# Patient Record
Sex: FEMALE | Race: WHITE | NOT HISPANIC OR LATINO | Employment: FULL TIME | URBAN - METROPOLITAN AREA
[De-identification: names, ages, dates, MRNs, and addresses within clinical notes are randomized per-mention and may not be internally consistent; named-entity substitution may affect disease eponyms.]

---

## 2017-03-14 ENCOUNTER — OFFICE VISIT (OUTPATIENT)
Dept: OBSTETRICS AND GYNECOLOGY | Facility: CLINIC | Age: 57
End: 2017-03-14

## 2017-03-14 VITALS
SYSTOLIC BLOOD PRESSURE: 137 MMHG | DIASTOLIC BLOOD PRESSURE: 82 MMHG | HEIGHT: 63 IN | WEIGHT: 190 LBS | BODY MASS INDEX: 33.66 KG/M2 | HEART RATE: 77 BPM

## 2017-03-14 DIAGNOSIS — N94.10 DYSPAREUNIA, FEMALE: Primary | ICD-10-CM

## 2017-03-14 PROCEDURE — 99203 OFFICE O/P NEW LOW 30 MIN: CPT | Performed by: OBSTETRICS & GYNECOLOGY

## 2017-03-14 RX ORDER — ALBUTEROL SULFATE 90 UG/1
2 AEROSOL, METERED RESPIRATORY (INHALATION) EVERY 6 HOURS
COMMUNITY

## 2017-03-14 RX ORDER — DILTIAZEM HYDROCHLORIDE 60 MG/1
TABLET, FILM COATED ORAL
COMMUNITY
Start: 2017-01-25

## 2017-03-14 NOTE — PROGRESS NOTES
"Chief Complaint   Patient presents with   • Pelvic Pain     referred by PCP       History of Present Illness    Patient is a 56 y.o. female complains of moderate pelvic pain. Pain is on the left and occurs during intercourse. Records from pcp reviewed. Patient had an ultrasound that had a small fibroid. Lining was on the upper limits of normal. Patient is menopausal for over a year and denies any bleeding. Patient does have history of interstitial cystitis but does not feel like her symptoms are related.     The following portions of the patient's history were reviewed and updated as appropriate: allergies, current medications, past family history, past medical history, past social history, past surgical history and problem list.    Review of Systems   Genitourinary: Positive for pelvic pain. Negative for vaginal bleeding.   All other systems reviewed and are negative.      Vitals:    03/14/17 0946   BP: 137/82   Pulse: 77   Weight: 190 lb (86.2 kg)   Height: 63\" (160 cm)       Objective   Physical Exam   Constitutional: She appears well-developed and well-nourished.   HENT:   Head: Normocephalic and atraumatic.   Abdominal: Soft. She exhibits no distension and no mass. There is no tenderness. There is no rebound and no guarding. No hernia.   Genitourinary: Rectum normal, vagina normal and uterus normal. Rectal exam shows no external hemorrhoid. There is no lesion on the right labia. There is no lesion on the left labia. Uterus is not tender. Cervix exhibits no discharge. Right adnexum displays no mass and no tenderness. Left adnexum displays no mass and no tenderness. No vaginal discharge found.   Genitourinary Comments: Atrophy noted   Musculoskeletal: Normal range of motion.   Lymphadenopathy:        Right: No inguinal adenopathy present.        Left: No inguinal adenopathy present.   Neurological: She is alert.   Skin: Skin is warm.   Psychiatric: She has a normal mood and affect.         Assessment/Plan "   Leilani was seen today for pelvic pain.    Diagnoses and all orders for this visit:    Dyspareunia, female    Suspect pain is from atrophy. Recommend over the counter lubricants. Risks of hormones reviewed - would like to avoid given cardiac history. Patient denies any bleeding so endometrial biopsy not indicated at this time. Patient to return if bleeding occurs.        Return if symptoms worsen or fail to improve.

## 2017-03-30 ENCOUNTER — TELEPHONE (OUTPATIENT)
Dept: OBSTETRICS AND GYNECOLOGY | Facility: CLINIC | Age: 57
End: 2017-03-30

## 2017-03-30 NOTE — TELEPHONE ENCOUNTER
----- Message from Kwasi Jimenez sent at 3/27/2017  2:12 PM EDT -----  RN with dr. Amaya office wants to discuss this patient concerning her ultrasound and course of care.    Ophelia(RN) -[phone]

## 2018-03-21 ENCOUNTER — OFFICE VISIT (OUTPATIENT)
Dept: OBSTETRICS AND GYNECOLOGY | Facility: CLINIC | Age: 58
End: 2018-03-21

## 2018-03-21 VITALS
BODY MASS INDEX: 33.31 KG/M2 | HEIGHT: 63 IN | WEIGHT: 188 LBS | HEART RATE: 69 BPM | SYSTOLIC BLOOD PRESSURE: 126 MMHG | DIASTOLIC BLOOD PRESSURE: 73 MMHG

## 2018-03-21 DIAGNOSIS — N94.10 DYSPAREUNIA, FEMALE: Primary | ICD-10-CM

## 2018-03-21 PROCEDURE — 99213 OFFICE O/P EST LOW 20 MIN: CPT | Performed by: OBSTETRICS & GYNECOLOGY

## 2020-09-16 ENCOUNTER — OFFICE (AMBULATORY)
Dept: URBAN - METROPOLITAN AREA CLINIC 75 | Facility: CLINIC | Age: 60
End: 2020-09-16

## 2020-09-16 VITALS — TEMPERATURE: 97.6 F | WEIGHT: 186 LBS | HEIGHT: 62 IN

## 2020-09-16 DIAGNOSIS — R14.0 ABDOMINAL DISTENSION (GASEOUS): ICD-10-CM

## 2020-09-16 DIAGNOSIS — K59.00 CONSTIPATION, UNSPECIFIED: ICD-10-CM

## 2020-09-16 DIAGNOSIS — R19.4 CHANGE IN BOWEL HABIT: ICD-10-CM

## 2020-09-16 DIAGNOSIS — R11.0 NAUSEA: ICD-10-CM

## 2020-09-16 DIAGNOSIS — R14.2 ERUCTATION: ICD-10-CM

## 2020-09-16 DIAGNOSIS — R12 HEARTBURN: ICD-10-CM

## 2020-09-16 PROCEDURE — 99244 OFF/OP CNSLTJ NEW/EST MOD 40: CPT | Performed by: INTERNAL MEDICINE

## 2020-09-16 RX ORDER — LINACLOTIDE 72 UG/1
CAPSULE, GELATIN COATED ORAL
Qty: 90 | Refills: 3 | Status: COMPLETED
Start: 2020-09-16 | End: 2020-12-08

## 2020-09-17 VITALS — HEIGHT: 62 IN | WEIGHT: 189 LBS

## 2020-12-16 ENCOUNTER — OFFICE (AMBULATORY)
Dept: URBAN - METROPOLITAN AREA CLINIC 75 | Facility: CLINIC | Age: 60
End: 2020-12-16
Payer: COMMERCIAL

## 2020-12-16 DIAGNOSIS — R14.0 ABDOMINAL DISTENSION (GASEOUS): ICD-10-CM

## 2020-12-16 DIAGNOSIS — R19.4 CHANGE IN BOWEL HABIT: ICD-10-CM

## 2020-12-16 DIAGNOSIS — R10.11 RIGHT UPPER QUADRANT PAIN: ICD-10-CM

## 2020-12-16 DIAGNOSIS — R11.0 NAUSEA: ICD-10-CM

## 2020-12-16 DIAGNOSIS — R12 HEARTBURN: ICD-10-CM

## 2020-12-16 DIAGNOSIS — K59.00 CONSTIPATION, UNSPECIFIED: ICD-10-CM

## 2020-12-16 DIAGNOSIS — R14.2 ERUCTATION: ICD-10-CM

## 2020-12-16 PROCEDURE — 99214 OFFICE O/P EST MOD 30 MIN: CPT | Mod: 95 | Performed by: INTERNAL MEDICINE

## 2020-12-16 RX ORDER — PLECANATIDE 3 MG/1
3 TABLET ORAL
Qty: 90 | Refills: 3 | Status: COMPLETED
Start: 2020-12-16 | End: 2024-02-28

## 2024-02-28 ENCOUNTER — OFFICE (AMBULATORY)
Dept: URBAN - METROPOLITAN AREA CLINIC 76 | Facility: CLINIC | Age: 64
End: 2024-02-28

## 2024-02-28 VITALS
DIASTOLIC BLOOD PRESSURE: 72 MMHG | OXYGEN SATURATION: 97 % | WEIGHT: 193 LBS | HEART RATE: 67 BPM | SYSTOLIC BLOOD PRESSURE: 130 MMHG | HEIGHT: 62 IN

## 2024-02-28 DIAGNOSIS — Z12.11 ENCOUNTER FOR SCREENING FOR MALIGNANT NEOPLASM OF COLON: ICD-10-CM

## 2024-02-28 PROCEDURE — NOCRG: HCPCS | Performed by: INTERNAL MEDICINE

## 2024-04-22 ENCOUNTER — TELEPHONE (OUTPATIENT)
Dept: OBSTETRICS AND GYNECOLOGY | Facility: CLINIC | Age: 64
End: 2024-04-22

## 2024-04-22 ENCOUNTER — OFFICE VISIT (OUTPATIENT)
Dept: OBSTETRICS AND GYNECOLOGY | Facility: CLINIC | Age: 64
End: 2024-04-22
Payer: COMMERCIAL

## 2024-04-22 VITALS
SYSTOLIC BLOOD PRESSURE: 144 MMHG | WEIGHT: 194.4 LBS | DIASTOLIC BLOOD PRESSURE: 81 MMHG | HEIGHT: 63 IN | BODY MASS INDEX: 34.45 KG/M2

## 2024-04-22 DIAGNOSIS — N94.89 ENDOMETRIAL MASS: Primary | ICD-10-CM

## 2024-04-22 PROCEDURE — 99204 OFFICE O/P NEW MOD 45 MIN: CPT | Performed by: OBSTETRICS & GYNECOLOGY

## 2024-04-22 RX ORDER — HYOSCYAMINE SULFATE 0.12 MG/1
0.25 TABLET SUBLINGUAL EVERY 6 HOURS PRN
COMMUNITY
Start: 2024-01-30 | End: 2025-01-29

## 2024-04-22 RX ORDER — SODIUM CHLORIDE 0.9 % (FLUSH) 0.9 %
10 SYRINGE (ML) INJECTION EVERY 12 HOURS SCHEDULED
OUTPATIENT
Start: 2024-04-22

## 2024-04-22 RX ORDER — LANOLIN ALCOHOL/MO/W.PET/CERES
1000 CREAM (GRAM) TOPICAL DAILY
COMMUNITY

## 2024-04-22 RX ORDER — MONTELUKAST SODIUM 10 MG/1
10 TABLET ORAL NIGHTLY
COMMUNITY

## 2024-04-22 RX ORDER — SODIUM CHLORIDE 9 MG/ML
40 INJECTION, SOLUTION INTRAVENOUS AS NEEDED
OUTPATIENT
Start: 2024-04-22

## 2024-04-22 RX ORDER — PANTOPRAZOLE SODIUM 20 MG/1
20 TABLET, DELAYED RELEASE ORAL DAILY
COMMUNITY
Start: 2024-03-11

## 2024-04-22 RX ORDER — SODIUM CHLORIDE 0.9 % (FLUSH) 0.9 %
10 SYRINGE (ML) INJECTION AS NEEDED
OUTPATIENT
Start: 2024-04-22

## 2024-04-22 RX ORDER — CETIRIZINE HYDROCHLORIDE 10 MG/1
10 TABLET ORAL DAILY
COMMUNITY

## 2024-04-22 NOTE — PROGRESS NOTES
BENITO Pérez  is a 63 y.o. female who presents for evaluation of an abnormal endometrium and an endometrial mass on ultrasound.  The patient had some free fluid in her pelvis which was noted by the surgeon when she had her appendectomy.  This caused her to be referred to a urological gynecologist who in turn ordered an ultrasound.  The patient and I reviewed her ultrasound report together and looked at a diagram.  The ultrasound shows a normal-sized uterus.  Endometrial lining is thickened to 1.4 cm with a 1.8 cm mass at the apex of the endometrium which could represent a polyp, fibroid or other lesion.  The patient denies vaginal bleeding.  She does not have pain day today.  She does have dyspareunia, but most recently over a year ago.  She presents today for further workup of this lesion.    Chief Complaint   Patient presents with    New Gyn     Patient is here to consult possibility of polyps or fibroids, was told her lining was 2x thick, stated they told her that info was faxed over for u to look at, she also wants to consult white spot on left nipple states see breast doctor in July wanted to consult if okay to wait or should be calling with concern        Past Medical History:   Diagnosis Date    Asthma     Yes on an inhale and CPAP machine    Diabetes mellitus 2023    I was told im pre-diabetic last year    Endometriosis 4/24    Heart disease     Hyperlipidemia     Hypertension     Kidney stone     Cant remember how long ago it was.    Seasonal allergies     Urinary tract infection     Had in past, no current       Past Surgical History:   Procedure Laterality Date    APPENDECTOMY  10/23    Had it removed 2023    CARDIAC CATHETERIZATION      TONSILLECTOMY      Age 15, I think    WISDOM TOOTH EXTRACTION      Cant remember date       Social History     Socioeconomic History    Marital status: Single   Tobacco Use    Smoking status: Former     Current packs/day: 0.50     Average packs/day: 0.5 packs/day  for 3.0 years (1.5 ttl pk-yrs)     Types: Cigarettes    Smokeless tobacco: Never   Substance and Sexual Activity    Alcohol use: Yes     Alcohol/week: 2.0 standard drinks of alcohol     Types: 1 Cans of beer, 1 Drinks containing 0.5 oz of alcohol per week     Comment: Occasional    Drug use: No    Sexual activity: Not Currently     Partners: Male     Birth control/protection: Post-menopausal       The following portions of the patient's history were reviewed and updated as appropriate: allergies, current medications, past family history, past medical history, past social history, past surgical history and problem list.    Review of Systems     This is positive for pelvic pain.  It is negative for fever or chills.  Negative for nausea or vomiting.  Negative unexplained weight loss.  Negative for vaginal bleeding.    Physical Exam  Vitals and nursing note reviewed.   Constitutional:       Appearance: Normal appearance.   Abdominal:      General: There is no distension.      Palpations: Abdomen is soft.      Tenderness: There is no abdominal tenderness.   Genitourinary:     Comments: Normal female external genitalia  The vagina is estrogenized, but without lesion  There is no blood in the vaginal vault  The cervix is normal in appearance.  It is not tender to palpation  The uterus is mobile within the pelvis.  It is normal in size.  It is nontender.  No adnexal masses or tenderness are palpable  Neurological:      Mental Status: She is alert and oriented to person, place, and time.         Assessment    Diagnoses and all orders for this visit:    1. Endometrial mass (Primary)  -     sodium chloride 0.9 % flush 10 mL  -     sodium chloride 0.9 % flush 10 mL  -     sodium chloride 0.9 % infusion 40 mL  -     Case Request; Standing  -     Case Request    Other orders  -     Follow Anesthesia Guidelines / Protocol; Future  -     Follow Anesthesia Guidelines / Protocol; Standing  -     Chlorhexidine Skin Prep; Future  -      Obtain Informed Consent; Standing  -     Verify / Perform Chlorhexidine Skin Prep; Standing  -     Insert Peripheral IV; Standing  -     Saline Lock & Maintain IV Access; Standing  -     Place Sequential Compression Device; Standing  -     Maintain Sequential Compression Device; Standing        Plan  I counseled the patient regarding her ultrasound findings and my recommendations for further management.  45 minutes were spent in direct face-to-face counseling regarding this issue.  We reviewed a diagram together and I answered the patient's questions.  I recommend hysteroscopy with dilation and curettage as well as Myosure resection of the lesion at the top of the endometrium.  We discussed the performance of this procedure.  We discussed its benefits, risks and alternatives.  I demonstrated the procedure on a diagram and answered the patient's questions.  After considering her options, the patient would like to proceed.  She will follow-up with her cardiologist and primary care physician first to confirm that no further testing will be required prior to surgery.  She does have a cardiac history.    No follow-ups on file.    Social History     Tobacco Use   Smoking Status Former    Current packs/day: 0.50    Average packs/day: 0.5 packs/day for 3.0 years (1.5 ttl pk-yrs)    Types: Cigarettes   Smokeless Tobacco Never

## 2024-04-22 NOTE — TELEPHONE ENCOUNTER
Caller: Leilani Pérez    Relationship: Self    Best call back number: [phone] / LVM    What is the best time to reach you: ANYTIME    Who are you requesting to speak with (clinical staff, provider,  specific staff member): STAFF    Do you know the name of the person who called: SATURNINO    What was the call regarding: GO OVER SURGERY DETAILS - PT RETURNING CALL - HUB WAS UNABLE TO WT TO OFFICE    THANK YOU!

## 2024-05-10 ENCOUNTER — TELEPHONE (OUTPATIENT)
Dept: OBSTETRICS AND GYNECOLOGY | Facility: CLINIC | Age: 64
End: 2024-05-10

## 2024-05-10 NOTE — TELEPHONE ENCOUNTER
Last seen 4/22/24 endometrial mass. Would like a call back to discuss her cardiologist  appt and also schedule surgery with you. Thank you  [phone]

## 2024-05-10 NOTE — TELEPHONE ENCOUNTER
I returned the patient's call.  She reports that she has spoken to her cardiologist and the cardiologist plans to do cardiac testing on 29 May.  They will address the issue of cardiac clearance for surgery after that test has been performed.

## 2024-05-10 NOTE — TELEPHONE ENCOUNTER
Caller: Leilani Pérez    Relationship: Self    Best call back number: [phone]    What is the best time to reach you: ANY    Who are you requesting to speak with (clinical staff, provider,  specific staff member): CLINICAL    What was the call regarding: PATIENT WOULD LIKE A CALL BACK TO DISCUSS HER APPT WITH THE CARDIOLOGIST AND ALSO SCHEDULE SURGERY WITH DR OVIEDO.

## 2024-05-30 ENCOUNTER — TELEPHONE (OUTPATIENT)
Dept: OBSTETRICS AND GYNECOLOGY | Facility: CLINIC | Age: 64
End: 2024-05-30

## 2024-05-30 PROBLEM — N94.89 ENDOMETRIAL MASS: Status: ACTIVE | Noted: 2024-04-22

## 2024-05-30 NOTE — TELEPHONE ENCOUNTER
Pt is scheduled for surgery on June 5th.  Pt is concerned and wants to know if you will be using a morcellator during this procedure?  Pt has some concerns about this if it is going to be used.  Can you please advise me or please reach out to the pt

## 2024-05-31 NOTE — TELEPHONE ENCOUNTER
Phone call.  The patient was searching [employer] over the weekend and discovered concerning information about the uterine morcellator.  She was concerned that this was the instrument that was going to be used during her procedure.  In fact, she is having a hysteroscopy with Myosure rather than a hysterectomy with a morcellator.  We discussed the difference.  We discussed the warnings associated with the morcellator and my belief that no such risk exists with the Myosure.  I answered the patient's questions about this.  I encouraged her to look up Myosure over the weekend and confirm that she is comfortable with this.  If she is comfortable, we will proceed with surgery on Wednesday.  If she is uncomfortable, I will bring her back to the office and we can discuss alternatives.  Also, the patient has been evaluated by her cardiologist and is cleared for the surgery that is scheduled.

## 2024-06-03 ENCOUNTER — TELEPHONE (OUTPATIENT)
Dept: OBSTETRICS AND GYNECOLOGY | Facility: CLINIC | Age: 64
End: 2024-06-03
Payer: COMMERCIAL

## 2024-06-03 ENCOUNTER — PRE-ADMISSION TESTING (OUTPATIENT)
Dept: PREADMISSION TESTING | Facility: HOSPITAL | Age: 64
End: 2024-06-03
Payer: COMMERCIAL

## 2024-06-03 VITALS
TEMPERATURE: 97.7 F | BODY MASS INDEX: 35.56 KG/M2 | DIASTOLIC BLOOD PRESSURE: 84 MMHG | HEIGHT: 62 IN | HEART RATE: 70 BPM | RESPIRATION RATE: 18 BRPM | SYSTOLIC BLOOD PRESSURE: 159 MMHG | OXYGEN SATURATION: 97 %

## 2024-06-03 LAB
ANION GAP SERPL CALCULATED.3IONS-SCNC: 11.6 MMOL/L (ref 5–15)
BUN SERPL-MCNC: 18 MG/DL (ref 8–23)
BUN/CREAT SERPL: 24.3 (ref 7–25)
CALCIUM SPEC-SCNC: 9.5 MG/DL (ref 8.6–10.5)
CHLORIDE SERPL-SCNC: 106 MMOL/L (ref 98–107)
CO2 SERPL-SCNC: 23.4 MMOL/L (ref 22–29)
CREAT SERPL-MCNC: 0.74 MG/DL (ref 0.57–1)
DEPRECATED RDW RBC AUTO: 40.4 FL (ref 37–54)
EGFRCR SERPLBLD CKD-EPI 2021: 92.2 ML/MIN/1.73
ERYTHROCYTE [DISTWIDTH] IN BLOOD BY AUTOMATED COUNT: 12.7 % (ref 12.3–15.4)
GLUCOSE SERPL-MCNC: 83 MG/DL (ref 65–99)
HCT VFR BLD AUTO: 41.4 % (ref 34–46.6)
HGB BLD-MCNC: 13.5 G/DL (ref 12–15.9)
MCH RBC QN AUTO: 28.7 PG (ref 26.6–33)
MCHC RBC AUTO-ENTMCNC: 32.6 G/DL (ref 31.5–35.7)
MCV RBC AUTO: 87.9 FL (ref 79–97)
PLATELET # BLD AUTO: 224 10*3/MM3 (ref 140–450)
PMV BLD AUTO: 11.2 FL (ref 6–12)
POTASSIUM SERPL-SCNC: 4.2 MMOL/L (ref 3.5–5.2)
QT INTERVAL: 435 MS
QTC INTERVAL: 413 MS
RBC # BLD AUTO: 4.71 10*6/MM3 (ref 3.77–5.28)
SODIUM SERPL-SCNC: 141 MMOL/L (ref 136–145)
WBC NRBC COR # BLD AUTO: 7.29 10*3/MM3 (ref 3.4–10.8)

## 2024-06-03 PROCEDURE — 93005 ELECTROCARDIOGRAM TRACING: CPT

## 2024-06-03 PROCEDURE — 80048 BASIC METABOLIC PNL TOTAL CA: CPT

## 2024-06-03 PROCEDURE — 85027 COMPLETE CBC AUTOMATED: CPT

## 2024-06-03 PROCEDURE — 93010 ELECTROCARDIOGRAM REPORT: CPT | Performed by: INTERNAL MEDICINE

## 2024-06-03 PROCEDURE — 36415 COLL VENOUS BLD VENIPUNCTURE: CPT

## 2024-06-03 RX ORDER — ROSUVASTATIN CALCIUM 20 MG/1
20 TABLET, COATED ORAL DAILY
COMMUNITY

## 2024-06-03 RX ORDER — BUDESONIDE AND FORMOTEROL FUMARATE DIHYDRATE 80; 4.5 UG/1; UG/1
2 AEROSOL RESPIRATORY (INHALATION) AS NEEDED
COMMUNITY

## 2024-06-03 NOTE — DISCHARGE INSTRUCTIONS
Take the following medications the morning of surgery: METOPROLOL, PANTOPRAZOLE  AND NHALERS      If you are on prescription narcotic pain medication to control your pain you may also take that medication the morning of surgery.    General Instructions:  Do not eat solid food after midnight the night before surgery.  You may drink clear liquids day of surgery but must stop at least one hour before your hospital arrival time.  It is beneficial for you to have a clear drink that contains carbohydrates the day of surgery.  We suggest a 12 to 20 ounce bottle of Gatorade or Powerade for non-diabetic patients or a 12 to 20 ounce bottle of G2 or Powerade Zero for diabetic patients. (Pediatric patients, are not advised to drink a 12 to 20 ounce carbohydrate drink)    Clear liquids are liquids you can see through.  Nothing red in color.     Plain water                               Sports drinks  Sodas                                   Gelatin (Jell-O)  Fruit juices without pulp such as white grape juice and apple juice  Popsicles that contain no fruit or yogurt  Tea or coffee (no cream or milk added)  Gatorade / Powerade  G2 / Powerade Zero    Infants may have breast milk up to four hours before surgery.  Infants drinking formula may drink formula up to six hours before surgery.   Patients who avoid smoking, chewing tobacco and alcohol for 4 weeks prior to surgery have a reduced risk of post-operative complications.  Quit smoking as many days before surgery as you can.  Do not smoke, use chewing tobacco or drink alcohol the day of surgery.   If applicable bring your C-PAP/ BI-PAP machine in with you to preop day of surgery.  Bring any papers given to you in the doctor’s office.  Wear clean comfortable clothes.  Do not wear contact lenses, false eyelashes or make-up.  Bring a case for your glasses.   Bring crutches or walker if applicable.  Remove all piercings.  Leave jewelry and any other valuables at home.  Hair  extensions with metal clips must be removed prior to surgery.  The Pre-Admission Testing nurse will instruct you to bring medications if unable to obtain an accurate list in Pre-Admission Testing.        If you were given a blood bank ID arm band remember to bring it with you the day of surgery.    Preventing a Surgical Site Infection:  For 2 to 3 days before surgery, avoid shaving with a razor because the razor can irritate skin and make it easier to develop an infection.    Any areas of open skin can increase the risk of a post-operative wound infection by allowing bacteria to enter and travel throughout the body.  Notify your surgeon if you have any skin wounds / rashes even if it is not near the expected surgical site.  The area will need assessed to determine if surgery should be delayed until it is healed.  The night prior to surgery shower using a fresh bar of anti-bacterial soap (such as Dial) and clean washcloth.  Sleep in a clean bed with clean clothing.  Do not allow pets to sleep with you.  Shower on the morning of surgery using a fresh bar of anti-bacterial soap (such as Dial) and clean washcloth.  Dry with a clean towel and dress in clean clothing.  Ask your surgeon if you will be receiving antibiotics prior to surgery.  Make sure you, your family, and all healthcare providers clean their hands with soap and water or an alcohol based hand  before caring for you or your wound.  CHLORHEXIDINE CLOTH INSTRUCTIONS  The morning of surgery follow these instructions using the Chlorhexidine cloths you've been given.  These steps reduce bacteria on the body.  Do not use the cloths near your eyes, ears mouth, genitalia or on open wounds.  Throw the cloths away after use but do not try to flush them down a toilet.      Open and remove one cloth at a time from the package.    Leave the cloth unfolded and begin the bathing.  Massage the skin with the cloths using gentle pressure to remove bacteria.  Do not  scrub harshly.   Follow the steps below with one 2% CHG cloth per area (6 total cloths).  One cloth for neck, shoulders and chest.  One cloth for both arms, hands, fingers and underarms (do underarms last).  One cloth for the abdomen followed by groin.  One cloth for right leg and foot including between the toes.  One cloth for left leg and foot including between the toes.  The last cloth is to be used for the back of the neck, back and buttocks.    Allow the CHG to air dry 3 minutes on the skin which will give it time to work and decrease the chance of irritation.  The skin may feel sticky until it is dry.  Do not rinse with water or any other liquid or you will lose the beneficial effects of the CHG.  If mild skin irritation occurs, do rinse the skin to remove the CHG.  Report this to the nurse at time of admission.  Do not apply lotions, creams, ointments, deodorants or perfumes after using the clothes. Dress in clean clothes before coming to the hospital.  Day of surgery:  Your arrival time is approximately two hours before your scheduled surgery time.  Upon arrival, a Pre-op nurse and Anesthesiologist will review your health history, obtain vital signs, and answer questions you may have.  The only belongings needed at this time will be a list of your home medications and if applicable your C-PAP/BI-PAP machine.  A Pre-op nurse will start an IV and you may receive medication in preparation for surgery, including something to help you relax.     Please be aware that surgery does come with discomfort.  We want to make every effort to control your discomfort so please discuss any uncontrolled symptoms with your nurse.   Your doctor will most likely have prescribed pain medications.      If you are going home after surgery you will receive individualized written care instructions before being discharged.  A responsible adult must drive you to and from the hospital on the day of your surgery and ideally stay with you  through the night.   .  Discharge prescriptions can be filled by the hospital pharmacy during regular pharmacy hours.  If you are having surgery late in the day/evening your prescription may be e-prescribed to your pharmacy.  Please verify your pharmacy hours or chose a 24 hour pharmacy to avoid not having access to your prescription because your pharmacy has closed for the day.    If you are staying overnight following surgery, you will be transported to your hospital room following the recovery period.  [facility] has all private rooms.    If you have any questions please call Pre-Admission Testing at [phone].  Deductibles and co-payments are collected on the day of service. Please be prepared to pay the required co-pay, deductible or deposit on the day of service as defined by your plan.    Call your surgeon immediately if you experience any of the following symptoms:  Sore Throat  Shortness of Breath or difficulty breathing  Cough  Chills  Body soreness or muscle pain  Headache  Fever  New loss of taste or smell  Do not arrive for your surgery ill.  Your procedure will need to be rescheduled to another time.  You will need to call your physician before the day of surgery to avoid any unnecessary exposure to hospital staff as well as other patients.

## 2024-06-03 NOTE — TELEPHONE ENCOUNTER
Pt called to let Dr Sanchez know that she wants to move forward with surgery.  Dr Sanchez has been advised.

## 2024-06-05 ENCOUNTER — ANESTHESIA EVENT (OUTPATIENT)
Dept: PERIOP | Facility: HOSPITAL | Age: 64
End: 2024-06-05
Payer: COMMERCIAL

## 2024-06-05 ENCOUNTER — ANESTHESIA (OUTPATIENT)
Dept: PERIOP | Facility: HOSPITAL | Age: 64
End: 2024-06-05
Payer: COMMERCIAL

## 2024-06-05 ENCOUNTER — HOSPITAL ENCOUNTER (OUTPATIENT)
Facility: HOSPITAL | Age: 64
Setting detail: HOSPITAL OUTPATIENT SURGERY
Discharge: HOME | End: 2024-06-05
Attending: OBSTETRICS & GYNECOLOGY | Admitting: OBSTETRICS & GYNECOLOGY
Payer: COMMERCIAL

## 2024-06-05 VITALS
DIASTOLIC BLOOD PRESSURE: 72 MMHG | WEIGHT: 196.4 LBS | BODY MASS INDEX: 35.92 KG/M2 | TEMPERATURE: 97.8 F | OXYGEN SATURATION: 100 % | SYSTOLIC BLOOD PRESSURE: 160 MMHG | RESPIRATION RATE: 16 BRPM | HEART RATE: 65 BPM

## 2024-06-05 DIAGNOSIS — N94.89 ENDOMETRIAL MASS: ICD-10-CM

## 2024-06-05 PROCEDURE — 25010000002 DEXAMETHASONE SODIUM PHOSPHATE 20 MG/5ML SOLUTION

## 2024-06-05 PROCEDURE — 25810000003 LACTATED RINGERS PER 1000 ML: Performed by: ANESTHESIOLOGY

## 2024-06-05 PROCEDURE — 25010000002 ONDANSETRON PER 1 MG

## 2024-06-05 PROCEDURE — S0260 H&P FOR SURGERY: HCPCS | Performed by: OBSTETRICS & GYNECOLOGY

## 2024-06-05 PROCEDURE — 88305 TISSUE EXAM BY PATHOLOGIST: CPT | Performed by: OBSTETRICS & GYNECOLOGY

## 2024-06-05 PROCEDURE — 25010000002 HYDROMORPHONE PER 4 MG

## 2024-06-05 PROCEDURE — 25010000002 MIDAZOLAM PER 1 MG: Performed by: ANESTHESIOLOGY

## 2024-06-05 PROCEDURE — 25010000002 LIDOCAINE 1 % SOLUTION: Performed by: ANESTHESIOLOGY

## 2024-06-05 PROCEDURE — 25010000002 FENTANYL CITRATE (PF) 50 MCG/ML SOLUTION: Performed by: ANESTHESIOLOGY

## 2024-06-05 PROCEDURE — 58561 HYSTEROSCOPY REMOVE MYOMA: CPT | Performed by: OBSTETRICS & GYNECOLOGY

## 2024-06-05 PROCEDURE — C1782 MORCELLATOR: HCPCS | Performed by: OBSTETRICS & GYNECOLOGY

## 2024-06-05 PROCEDURE — 25010000002 PROPOFOL 10 MG/ML EMULSION

## 2024-06-05 RX ORDER — DROPERIDOL 2.5 MG/ML
0.62 INJECTION, SOLUTION INTRAMUSCULAR; INTRAVENOUS
Status: DISCONTINUED | OUTPATIENT
Start: 2024-06-05 | End: 2024-06-05 | Stop reason: HOSPADM

## 2024-06-05 RX ORDER — SODIUM CHLORIDE 0.9 % (FLUSH) 0.9 %
10 SYRINGE (ML) INJECTION EVERY 12 HOURS SCHEDULED
Status: DISCONTINUED | OUTPATIENT
Start: 2024-06-05 | End: 2024-06-05 | Stop reason: HOSPADM

## 2024-06-05 RX ORDER — LABETALOL HYDROCHLORIDE 5 MG/ML
5 INJECTION, SOLUTION INTRAVENOUS
Status: DISCONTINUED | OUTPATIENT
Start: 2024-06-05 | End: 2024-06-05 | Stop reason: HOSPADM

## 2024-06-05 RX ORDER — DIPHENHYDRAMINE HYDROCHLORIDE 50 MG/ML
12.5 INJECTION INTRAMUSCULAR; INTRAVENOUS
Status: DISCONTINUED | OUTPATIENT
Start: 2024-06-05 | End: 2024-06-05 | Stop reason: HOSPADM

## 2024-06-05 RX ORDER — FAMOTIDINE 10 MG/ML
20 INJECTION, SOLUTION INTRAVENOUS ONCE
Status: COMPLETED | OUTPATIENT
Start: 2024-06-05 | End: 2024-06-05

## 2024-06-05 RX ORDER — SODIUM CHLORIDE, SODIUM LACTATE, POTASSIUM CHLORIDE, CALCIUM CHLORIDE 600; 310; 30; 20 MG/100ML; MG/100ML; MG/100ML; MG/100ML
9 INJECTION, SOLUTION INTRAVENOUS CONTINUOUS
Status: DISCONTINUED | OUTPATIENT
Start: 2024-06-05 | End: 2024-06-05 | Stop reason: HOSPADM

## 2024-06-05 RX ORDER — HYDROMORPHONE HYDROCHLORIDE 1 MG/ML
0.5 INJECTION, SOLUTION INTRAMUSCULAR; INTRAVENOUS; SUBCUTANEOUS
Status: DISCONTINUED | OUTPATIENT
Start: 2024-06-05 | End: 2024-06-05 | Stop reason: HOSPADM

## 2024-06-05 RX ORDER — SODIUM CHLORIDE 0.9 % (FLUSH) 0.9 %
10 SYRINGE (ML) INJECTION AS NEEDED
Status: DISCONTINUED | OUTPATIENT
Start: 2024-06-05 | End: 2024-06-05 | Stop reason: HOSPADM

## 2024-06-05 RX ORDER — MIDAZOLAM HYDROCHLORIDE 1 MG/ML
1 INJECTION INTRAMUSCULAR; INTRAVENOUS
Status: DISCONTINUED | OUTPATIENT
Start: 2024-06-05 | End: 2024-06-05 | Stop reason: HOSPADM

## 2024-06-05 RX ORDER — SODIUM CHLORIDE 9 MG/ML
40 INJECTION, SOLUTION INTRAVENOUS AS NEEDED
Status: DISCONTINUED | OUTPATIENT
Start: 2024-06-05 | End: 2024-06-05 | Stop reason: HOSPADM

## 2024-06-05 RX ORDER — NALOXONE HCL 0.4 MG/ML
0.2 VIAL (ML) INJECTION AS NEEDED
Status: DISCONTINUED | OUTPATIENT
Start: 2024-06-05 | End: 2024-06-05 | Stop reason: HOSPADM

## 2024-06-05 RX ORDER — EPHEDRINE SULFATE 50 MG/ML
5 INJECTION, SOLUTION INTRAVENOUS ONCE AS NEEDED
Status: DISCONTINUED | OUTPATIENT
Start: 2024-06-05 | End: 2024-06-05 | Stop reason: HOSPADM

## 2024-06-05 RX ORDER — SODIUM CHLORIDE 0.9 % (FLUSH) 0.9 %
3 SYRINGE (ML) INJECTION EVERY 12 HOURS SCHEDULED
Status: DISCONTINUED | OUTPATIENT
Start: 2024-06-05 | End: 2024-06-05 | Stop reason: HOSPADM

## 2024-06-05 RX ORDER — FLUMAZENIL 0.1 MG/ML
0.2 INJECTION INTRAVENOUS AS NEEDED
Status: DISCONTINUED | OUTPATIENT
Start: 2024-06-05 | End: 2024-06-05 | Stop reason: HOSPADM

## 2024-06-05 RX ORDER — OXYCODONE AND ACETAMINOPHEN 7.5; 325 MG/1; MG/1
1 TABLET ORAL EVERY 4 HOURS PRN
Status: DISCONTINUED | OUTPATIENT
Start: 2024-06-05 | End: 2024-06-05 | Stop reason: HOSPADM

## 2024-06-05 RX ORDER — IPRATROPIUM BROMIDE AND ALBUTEROL SULFATE 2.5; .5 MG/3ML; MG/3ML
3 SOLUTION RESPIRATORY (INHALATION) ONCE AS NEEDED
Status: DISCONTINUED | OUTPATIENT
Start: 2024-06-05 | End: 2024-06-05 | Stop reason: HOSPADM

## 2024-06-05 RX ORDER — PROMETHAZINE HYDROCHLORIDE 25 MG/1
25 TABLET ORAL ONCE AS NEEDED
Status: DISCONTINUED | OUTPATIENT
Start: 2024-06-05 | End: 2024-06-05 | Stop reason: HOSPADM

## 2024-06-05 RX ORDER — LIDOCAINE HYDROCHLORIDE 10 MG/ML
0.5 INJECTION, SOLUTION INFILTRATION; PERINEURAL ONCE AS NEEDED
Status: COMPLETED | OUTPATIENT
Start: 2024-06-05 | End: 2024-06-05

## 2024-06-05 RX ORDER — HYDRALAZINE HYDROCHLORIDE 20 MG/ML
5 INJECTION INTRAMUSCULAR; INTRAVENOUS
Status: DISCONTINUED | OUTPATIENT
Start: 2024-06-05 | End: 2024-06-05 | Stop reason: HOSPADM

## 2024-06-05 RX ORDER — ONDANSETRON 2 MG/ML
4 INJECTION INTRAMUSCULAR; INTRAVENOUS ONCE AS NEEDED
Status: DISCONTINUED | OUTPATIENT
Start: 2024-06-05 | End: 2024-06-05 | Stop reason: HOSPADM

## 2024-06-05 RX ORDER — FENTANYL CITRATE 50 UG/ML
50 INJECTION, SOLUTION INTRAMUSCULAR; INTRAVENOUS
Status: DISCONTINUED | OUTPATIENT
Start: 2024-06-05 | End: 2024-06-05 | Stop reason: HOSPADM

## 2024-06-05 RX ORDER — PROMETHAZINE HYDROCHLORIDE 25 MG/1
25 SUPPOSITORY RECTAL ONCE AS NEEDED
Status: DISCONTINUED | OUTPATIENT
Start: 2024-06-05 | End: 2024-06-05 | Stop reason: HOSPADM

## 2024-06-05 RX ORDER — FENTANYL CITRATE 50 UG/ML
50 INJECTION, SOLUTION INTRAMUSCULAR; INTRAVENOUS ONCE AS NEEDED
Status: COMPLETED | OUTPATIENT
Start: 2024-06-05 | End: 2024-06-05

## 2024-06-05 RX ORDER — ONDANSETRON 2 MG/ML
INJECTION INTRAMUSCULAR; INTRAVENOUS AS NEEDED
Status: DISCONTINUED | OUTPATIENT
Start: 2024-06-05 | End: 2024-06-05 | Stop reason: SURG

## 2024-06-05 RX ORDER — SODIUM CHLORIDE 0.9 % (FLUSH) 0.9 %
3-10 SYRINGE (ML) INJECTION AS NEEDED
Status: DISCONTINUED | OUTPATIENT
Start: 2024-06-05 | End: 2024-06-05 | Stop reason: HOSPADM

## 2024-06-05 RX ORDER — MAGNESIUM HYDROXIDE 1200 MG/15ML
LIQUID ORAL AS NEEDED
Status: DISCONTINUED | OUTPATIENT
Start: 2024-06-05 | End: 2024-06-05 | Stop reason: HOSPADM

## 2024-06-05 RX ORDER — HYDROCODONE BITARTRATE AND ACETAMINOPHEN 5; 325 MG/1; MG/1
1 TABLET ORAL ONCE AS NEEDED
Status: COMPLETED | OUTPATIENT
Start: 2024-06-05 | End: 2024-06-05

## 2024-06-05 RX ORDER — HYDROCODONE BITARTRATE AND ACETAMINOPHEN 5; 325 MG/1; MG/1
1 TABLET ORAL EVERY 8 HOURS PRN
Qty: 8 TABLET | Refills: 0 | Status: SHIPPED | OUTPATIENT
Start: 2024-06-05

## 2024-06-05 RX ORDER — PROPOFOL 10 MG/ML
VIAL (ML) INTRAVENOUS AS NEEDED
Status: DISCONTINUED | OUTPATIENT
Start: 2024-06-05 | End: 2024-06-05 | Stop reason: SURG

## 2024-06-05 RX ORDER — DEXAMETHASONE SODIUM PHOSPHATE 4 MG/ML
INJECTION, SOLUTION INTRA-ARTICULAR; INTRALESIONAL; INTRAMUSCULAR; INTRAVENOUS; SOFT TISSUE AS NEEDED
Status: DISCONTINUED | OUTPATIENT
Start: 2024-06-05 | End: 2024-06-05 | Stop reason: SURG

## 2024-06-05 RX ADMIN — DEXAMETHASONE SODIUM PHOSPHATE 8 MG: 4 INJECTION, SOLUTION INTRAMUSCULAR; INTRAVENOUS at 10:35

## 2024-06-05 RX ADMIN — FENTANYL CITRATE 50 MCG: 50 INJECTION, SOLUTION INTRAMUSCULAR; INTRAVENOUS at 10:28

## 2024-06-05 RX ADMIN — ONDANSETRON 4 MG: 2 INJECTION INTRAMUSCULAR; INTRAVENOUS at 10:41

## 2024-06-05 RX ADMIN — FAMOTIDINE 20 MG: 10 INJECTION INTRAVENOUS at 09:45

## 2024-06-05 RX ADMIN — LIDOCAINE HYDROCHLORIDE 100 MG: 10 INJECTION, SOLUTION INFILTRATION; PERINEURAL at 10:28

## 2024-06-05 RX ADMIN — SODIUM CHLORIDE, POTASSIUM CHLORIDE, SODIUM LACTATE AND CALCIUM CHLORIDE 9 ML/HR: 600; 310; 30; 20 INJECTION, SOLUTION INTRAVENOUS at 09:25

## 2024-06-05 RX ADMIN — HYDROMORPHONE HYDROCHLORIDE 0.5 MG: 1 INJECTION, SOLUTION INTRAMUSCULAR; INTRAVENOUS; SUBCUTANEOUS at 11:31

## 2024-06-05 RX ADMIN — PROPOFOL 200 MG: 10 INJECTION, EMULSION INTRAVENOUS at 10:28

## 2024-06-05 RX ADMIN — HYDROCODONE BITARTRATE AND ACETAMINOPHEN 1 TABLET: 5; 325 TABLET ORAL at 11:48

## 2024-06-05 RX ADMIN — MIDAZOLAM 1 MG: 1 INJECTION INTRAMUSCULAR; INTRAVENOUS at 09:45

## 2024-06-05 RX ADMIN — FENTANYL CITRATE 50 MCG: 50 INJECTION, SOLUTION INTRAMUSCULAR; INTRAVENOUS at 10:52

## 2024-06-05 NOTE — OP NOTE
Operative Note      Leilani Pérez  63 y.o.  1960  female  [id number]      6/5/2024    Surgeons and Role:     * Jj Sanchez MD - Primary     Pre-op Diagnosis:   Endometrial mass [N94.89]    Post-Op Diagnosis Codes:     * Endometrial mass [N94.89]      Findings/Complications:  3 cm mass at the apex of the endometrial cavity.  Appearance is consistent with a fibroid.  The cavity is intact.  No other pathology is encountered.    Description of procedure:  Procedure(s) and Anesthesia Type:     * DILATATION AND CURETTAGE HYSTEROSCOPY WITH MYOSURE - General  The patient was taken to the operating room where general anesthesia was induced.  She was then placed in dorsal lithotomy position using candycane stirrups.  Examination under anesthesia revealed a normal-sized uterus which was mobile within the pelvis.  No adnexal masses were palpable.      The pelvis and perineum were prepped and draped in the usual sterile fashion and a weighted speculum was placed for exposure.  The cervix was grasped with a single-tooth tenaculum and dilated using Hanks dilators.  The Omni scope was then tested and introduced into the endometrial cavity.  The cavity was intact.  Both tubal ostia were visualized.  There was a mass at the apex of the endometrium which filled the fundus.  It had the appearance of a fibroid.      At this point, I elected to use the Myosure XL device.  This was introduced through the Omni scope and brought against the mass.  This was used to resect the mass.  At the conclusion of this, the mass was completely resected.  I was able to view the entire endometrium without obstruction.  There was no additional pathology.  The remainder of the endometrium was atrophic.  Both tubal ostia were visualized.      The hysteroscope was slowly withdrawn with no pathology encountered on the way out.  The cervix was long but otherwise normal.      All instruments were removed and the operative site was examined.  It was  hemostatic.  The patient was taken to recovery in stable condition.  Anesthesia= General    Estimated Blood Loss: minimal    Specimens:   Order Name Source Comment Collection Info Order Time   TISSUE PATHOLOGY EXAM Endometrial Curettings  Collected By: Jj Sanchez MD 6/5/2024 10:58 AM     Release to patient   Routine Release            [unfilled]      Jj Sanchez MD  6/5/2024

## 2024-06-05 NOTE — ANESTHESIA PREPROCEDURE EVALUATION
Anesthesia Evaluation     no history of anesthetic complications:   NPO Solid Status: > 8 hours  NPO Liquid Status: > 2 hours           Airway   Mallampati: III  TM distance: <3 FB  Large neck circumference and Possible difficult intubation  Dental - normal exam     Pulmonary    (+) a smoker Former, asthma,shortness of breath, sleep apnea on CPAP  Cardiovascular - normal exam    ECG reviewed    (+) hypertension, CAD, angina, hyperlipidemia    ROS comment: Nl scan per clearance note          Neuro/Psych  GI/Hepatic/Renal/Endo    (+) GERD, renal disease- stones, diabetes mellitus    Musculoskeletal     Abdominal    Substance History      OB/GYN          Other                          Anesthesia Plan    ASA 3     general     (Cleared by cardiology)  intravenous induction     Anesthetic plan, risks, benefits, and alternatives have been provided, discussed and informed consent has been obtained with: patient.        CODE STATUS:

## 2024-06-05 NOTE — ANESTHESIA POSTPROCEDURE EVALUATION
Patient: Leilani Pérez    Procedure Summary       Date: 06/05/24 Room / Location: [facility] OR 09 / [facility] MAIN OR    Anesthesia Start: 1022 Anesthesia Stop: 1114    Procedure: DILATATION AND CURETTAGE HYSTEROSCOPY WITH MYOSURE (Uterus) Diagnosis:       Endometrial mass      (Endometrial mass [N94.89])    Surgeons: Jj Sanchez MD Provider: Cornelio Wing MD    Anesthesia Type: general ASA Status: 3            Anesthesia Type: general    Vitals  Vitals Value Taken Time   /73 06/05/24 1200   Temp 36.6 °C (97.8 °F) 06/05/24 1111   Pulse 63 06/05/24 1204   Resp 16 06/05/24 1200   SpO2 99 % 06/05/24 1204   Vitals shown include unfiled device data.        Post Anesthesia Care and Evaluation    Patient location during evaluation: PHASE II  Patient participation: complete - patient participated  Level of consciousness: awake and alert  Pain management: adequate    Airway patency: patent  Anesthetic complications: No anesthetic complications  PONV Status: none  Cardiovascular status: acceptable and hemodynamically stable  Respiratory status: acceptable, nonlabored ventilation and spontaneous ventilation  Hydration status: acceptable

## 2024-06-05 NOTE — H&P
H&P Note    Patient Identification:  Name: Leilani Pérez  Age: 63 y.o.  Sex: female  :  1960  MRN: [id number]                       Chief Complaint: Abnormal endometrium with an endometrial mass    History of Present Illness:   63-year-old lady who was referred for evaluation of an abnormal endometrium.  The patient had a pelvic ultrasound which showed endometrial thickening to 1.4 cm with a 1.8 cm mass at the apex of the endometrium.  This is concerning for a polyp, fibroid or other lesion.  I counseled the patient regarding these findings in the office and we reviewed a diagram together.  I recommend hysteroscopy, dilation and curettage, Myosure resection of the lesion.  We discussed these procedures as well as the benefits, risks and alternatives.  I answered the patient's questions and she agreed to proceed.  We also discussed the fact that the Myosure device does morcellate tissue, but is different from the abdominal morcellator that is used for laparoscopic supracervical hysterectomy.  The patient also researched this independently and has agreed to proceed.    [facility]  [facility]  Past Medical History:  Past Medical History:   Diagnosis Date    Asthma     Yes on an inhale and CPAP machine    COPD (chronic obstructive pulmonary disease)     Coronary artery disease     Diverticulosis     Endometriosis     GERD (gastroesophageal reflux disease)     Heart disease     History of kidney stones     History of sciatica     Hyperlipidemia     Hypertension     IC (interstitial cystitis)     Kidney stone     Cant remember how long ago it was.    Seasonal allergies     Sleep apnea     CPAP    Spine disorder     Urinary tract infection     Had in past, no current     Past Surgical History:  Past Surgical History:   Procedure Laterality Date    APPENDECTOMY  10/23    Had it removed     CARDIAC CATHETERIZATION      TONSILLECTOMY      Age 15, I think    WISDOM TOOTH EXTRACTION      Cant remember date       Home Meds:  Medications Prior to Admission   Medication Sig Dispense Refill Last Dose    budesonide-formoterol (SYMBICORT) 80-4.5 MCG/ACT inhaler Inhale 2 puffs As Needed.   6/5/2024 at 0600    cetirizine (zyrTEC) 10 MG tablet Take 1 tablet by mouth Daily.   Past Week    fluticasone (FLONASE) 50 MCG/ACT nasal spray 1 spray into the nostril(s) as directed by provider As Needed.   Past Week    lisinopril (PRINIVIL,ZESTRIL) 40 MG tablet Take 0.5 tablets by mouth Daily. PT TO HOLD 24 HOURS PRIOR TO SURGERY   6/4/2024 at 0800    metoprolol tartrate (LOPRESSOR) 25 MG tablet Take 1 tablet by mouth 2 (Two) Times a Day.   6/5/2024 at 0600    montelukast (SINGULAIR) 10 MG tablet Take 1 tablet by mouth Every Night. HELD FOR SURGERY   Past Week    pantoprazole (PROTONIX) 20 MG EC tablet Take 1 tablet by mouth As Needed.   6/5/2024 at 0600    rosuvastatin (CRESTOR) 20 MG tablet Take 1 tablet by mouth Daily.   6/4/2024 at 1900    albuterol (PROVENTIL HFA;VENTOLIN HFA) 108 (90 BASE) MCG/ACT inhaler Inhale 2 puffs Every 6 (Six) Hours.   More than a month    aspirin 81 MG tablet Take 1 tablet by mouth Daily. HELD FOR SURGERY   5/30/2024 at 0800     Current Meds:   [unfilled]  Allergies:  Allergies   Allergen Reactions    Sulfa Antibiotics Rash     Immunizations:    There is no immunization history on file for this patient.  Social History:   Social History     Tobacco Use    Smoking status: Former     Current packs/day: 0.50     Average packs/day: 0.5 packs/day for 3.0 years (1.5 ttl pk-yrs)     Types: Cigarettes    Smokeless tobacco: Never   Substance Use Topics    Alcohol use: Yes     Alcohol/week: 2.0 standard drinks of alcohol     Types: 1 Cans of beer, 1 Drinks containing 0.5 oz of alcohol per week     Comment: Occasional      Family History:  Family History   Problem Relation Age of Onset    Heart disease Mother     Stroke Mother     Cancer Mother         breast    Breast cancer Mother         Had one breast removed in her  40's, past away in , multiple seizures    Heart disease Father     Cancer Father         skin    Diabetes Father     Breast cancer Father         Melanoma cancer, past away in , heart attack    Hypertension Sister     Cancer Sister         breast    Breast cancer Sister         Had one breast removed in her 50's, progressed to Stage 4 Metastatic Breast Cancer, passed away in     Diabetes Sister     Hypertension Sister     Breast cancer Sister         Had Stage 4 Lung Cancer passed away in 2023    Hypertension Brother     Diabetes Brother     Breast cancer Brother         Stage 4 Melanoma cancer, found out Aug 2022, past away a month later.    Hypertension Paternal Grandmother     Diabetes Paternal Grandmother     Cancer Paternal Grandfather         lung    Ovarian cancer Neg Hx     Uterine cancer Neg Hx     Colon cancer Neg Hx     Malig Hyperthermia Neg Hx         Review of Systems  A comprehensive review of systems was negative.    Objective:  tMax 24 hrs: Temp (24hrs), Av.7 °F (36.5 °C), Min:97.7 °F (36.5 °C), Max:97.7 °F (36.5 °C)    Vitals Ranges:   Temp:  [97.7 °F (36.5 °C)] 97.7 °F (36.5 °C)  Heart Rate:  [55] 55  Resp:  [16] 16  BP: (155)/(73) 155/73  Intake and Output Last 3 Shifts:   No intake/output data recorded.    Exam:     General Appearance:    Alert, cooperative, no distress, appears stated age   Head:    Normocephalic, without obvious abnormality, atraumatic   Back:     Symmetric, no curvature, ROM normal, no CVA tenderness   Lungs:     Clear to auscultation bilaterally, respirations unlabored   Chest Wall:    No tenderness or deformity    Heart:    Regular rate and rhythm, S1 and S2 normal, no murmur, rub   or gallop       Abdomen:     Soft, non-tender, bowel sounds active all four quadrants,     no masses, no organomegaly           Extremities:   Extremities normal, atraumatic, no cyanosis or edema   Skin:   Skin color, texture, turgor normal, no rashes or lesions   Lymph  nodes:   Cervical, supraclavicular, and axillary nodes normal       Data Review:     Lab Results (last 24 hours)       ** No results found for the last 24 hours. **          Assessment:    Endometrial mass          Plan:  Hysteroscopy, dilation and curettage, Myosure resection of an endometrial mass.  I counseled the patient again in preoperative holding and answered her questions.  She would like to proceed.    Jj Sanchez MD  6/5/2024

## 2024-06-05 NOTE — ANESTHESIA PROCEDURE NOTES
Airway  Urgency: elective    Date/Time: 6/5/2024 10:30 AM  Airway not difficult    General Information and Staff    Patient location during procedure: OR  Anesthesiologist: Cornelio Wing MD  CRNA/CAA: Danny Woods CRNA    Indications and Patient Condition  Indications for airway management: airway protection    Preoxygenated: yes  MILS maintained throughout  Mask difficulty assessment: 0 - not attempted    Final Airway Details  Final airway type: supraglottic airway      Successful airway: unique  Size 4     Number of attempts at approach: 1  Assessment: lips, teeth, and gum same as pre-op and atraumatic intubation

## 2024-06-06 ENCOUNTER — TELEPHONE (OUTPATIENT)
Dept: OBSTETRICS AND GYNECOLOGY | Facility: CLINIC | Age: 64
End: 2024-06-06
Payer: COMMERCIAL

## 2024-06-06 LAB
LAB AP CASE REPORT: NORMAL
PATH REPORT.FINAL DX SPEC: NORMAL
PATH REPORT.GROSS SPEC: NORMAL

## 2024-06-06 RX ORDER — AMOXICILLIN AND CLAVULANATE POTASSIUM 875; 125 MG/1; MG/1
1 TABLET, FILM COATED ORAL 2 TIMES DAILY
Qty: 14 TABLET | Refills: 0 | Status: SHIPPED | OUTPATIENT
Start: 2024-06-06 | End: 2024-06-13

## 2024-06-06 NOTE — TELEPHONE ENCOUNTER
Provider: DR OVIEDO    Caller: JING MENJIVAR    Phone Number: [phone]    Reason for Call: PATIENT WAS JUST CALLING TO CHECK STATUS FROM HER MESSAGE EARLIER PLEASE SEE FIRST TE AND FOLLOW UP

## 2024-06-06 NOTE — TELEPHONE ENCOUNTER
Daniel pt had D&C, hysteroscopy yesterday, 6/5/24. Experiencing burning with urination. Drinking more water but she is planning to go out of town tomorrow. Requesting Rx for UTI. Wilbert on Raman. Thank you

## 2024-06-06 NOTE — TELEPHONE ENCOUNTER
I would prefer to treat based on urine testing, but since she is going out of town I will send an antibiotic to her pharmacy. She should be seen in the office if there is no improvement in 2-3 days.

## 2024-06-06 NOTE — TELEPHONE ENCOUNTER
Provider: DR. OVIEDO    Caller: JING MENJIVAR    Relationship to Patient: SELF    Pharmacy: [facility]    Phone Number: [phone]    Reason for Call: PT HAD D&C TO REMOVE FIBROID & HYSTEROSCOPY YESTERDAY BUT IS EXPERIENCING BURNING URINATION. SHE HAS BEEN DRINKING MORE WATER WHICH SEEMS TO HELP. THIS STARTED AFTER SHE GOT HOME. SHE IS PLANNING TO GO OUT OF TOWN TOM.    When was the patient last seen: YESTERDAY SURGERY

## 2024-06-21 ENCOUNTER — OFFICE VISIT (OUTPATIENT)
Dept: OBSTETRICS AND GYNECOLOGY | Facility: CLINIC | Age: 64
End: 2024-06-21
Payer: COMMERCIAL

## 2024-06-21 VITALS — DIASTOLIC BLOOD PRESSURE: 80 MMHG | WEIGHT: 200.8 LBS | BODY MASS INDEX: 36.73 KG/M2 | SYSTOLIC BLOOD PRESSURE: 120 MMHG

## 2024-06-21 DIAGNOSIS — N95.1 MENOPAUSAL SYMPTOMS: Primary | ICD-10-CM

## 2024-06-21 DIAGNOSIS — Z09 POSTOP CHECK: ICD-10-CM

## 2024-06-21 NOTE — PROGRESS NOTES
BENITO Pérez  is a 63 y.o. female who presents for 2 reasons.  First, she is 2 weeks out from hysteroscopy with Myosure resection of an endometrial mass.  The mass was benign.  The patient is doing well from surgery.  She had dysuria for 1 day and it has completely resolved.  She was prescribed antibiotics, but did not take them because her symptoms completely resolved.  Next, the patient has hot flashes and night sweats.  These have become increasingly disruptive to the patient.  She would like to discuss options.  She is not interested in taking estrogen due to a strong family history of breast cancer.  The patient is followed by an oncologist for this and does alternating mammograms and MRIs.    Chief Complaint   Patient presents with    Post-op     2 week follow up       Past Medical History:   Diagnosis Date    Asthma     Yes on an inhale and CPAP machine    COPD (chronic obstructive pulmonary disease)     Coronary artery disease     Diverticulosis     Endometriosis 4/24    GERD (gastroesophageal reflux disease)     Heart disease     History of kidney stones     History of sciatica     Hyperlipidemia     Hypertension     IC (interstitial cystitis)     Kidney stone     Cant remember how long ago it was.    Seasonal allergies     Sleep apnea     CPAP    Spine disorder     Urinary tract infection     Had in past, no current       Past Surgical History:   Procedure Laterality Date    APPENDECTOMY  10/23    Had it removed 2023    CARDIAC CATHETERIZATION      D & C HYSTEROSCOPY N/A 6/5/2024    Procedure: DILATATION AND CURETTAGE HYSTEROSCOPY WITH MYOSURE;  Surgeon: Jj Sanchez MD;  Location: [facility];  Service: Obstetrics/Gynecology;  Laterality: N/A;    TONSILLECTOMY      Age 15, I think    WISDOM TOOTH EXTRACTION      Cant remember date       Social History     Socioeconomic History    Marital status: Single   Tobacco Use    Smoking status: Former     Current packs/day: 0.50     Average  packs/day: 0.5 packs/day for 3.0 years (1.5 ttl pk-yrs)     Types: Cigarettes    Smokeless tobacco: Never   Vaping Use    Vaping status: Never Used   Substance and Sexual Activity    Alcohol use: Yes     Alcohol/week: 2.0 standard drinks of alcohol     Types: 1 Cans of beer, 1 Drinks containing 0.5 oz of alcohol per week     Comment: Occasional    Drug use: No    Sexual activity: Not Currently     Partners: Male     Birth control/protection: Post-menopausal       The following portions of the patient's history were reviewed and updated as appropriate: allergies, current medications, past family history, past medical history, past social history, past surgical history and problem list.    Review of Systems     This is negative for fever or chills.  Negative for nausea or vomiting.  Negative for vaginal bleeding.  Positive for hot flashes and night sweats.  Positive for mood swings.    Physical Exam  Vitals and nursing note reviewed.   Constitutional:       Appearance: Normal appearance.   Abdominal:      General: There is no distension.      Palpations: Abdomen is soft.      Tenderness: There is no abdominal tenderness.   Genitourinary:     Comments: Normal female external genitalia  The vagina is atrophic, but without lesion.  Vaginal support is adequate.  The cervix is normal in appearance.  It is not tender to palpation.  The uterus is mobile within the pelvis.  It is normal in size.  It is nontender.  No adnexal masses are palpable.  Neurological:      Mental Status: She is alert and oriented to person, place, and time.         Assessment    Diagnoses and all orders for this visit:    1. Menopausal symptoms (Primary)    2. Postop check        Plan  Appropriate progress 2 weeks postop.  Okay to resume all normal activities of daily living.  Pathology report was reviewed and discussed with the patient in detail.  There is evidence of a polyp with no hyperplasia or neoplasia.  Endometrial curettings also showed no  evidence of hyperplasia or neoplasia.  The patient had her D&C, hysteroscopy due to fluid seen in the pelvis at laparoscopy by general surgery.  She has not been symptomatic and has not experienced any vaginal bleeding preoperatively or postoperatively.  We discussed surveillance going forward.  Because there was no hyperplasia or neoplasia and the patient is not symptomatic, expectant management would be appropriate with further surveillance if episodes of bleeding occur.  As an alternative, ultrasound could be performed in 6 months to assess endometrial thickness.  I answered the patient's questions.  She would like to manage expectantly and will contact me if she develops symptoms of bleeding, abdominal bloating, pelvic pressure, pelvic pain or any other pelvic related symptoms.  Hot flashes and night sweats.  Counseled and questions answered.  The patient would like to avoid hormone replacement due to a strong family history of breast cancer.  We discussed the benefits and risks of using aerobic exercise versus Effexor or versus Veozah.  I answered the patient's questions.  She would like to consult with her oncologist and consider her options.  She will contact me if she would like to begin any prescription medication.    Return in about 1 year (around 6/21/2025).    Social History     Tobacco Use   Smoking Status Former    Current packs/day: 0.50    Average packs/day: 0.5 packs/day for 3.0 years (1.5 ttl pk-yrs)    Types: Cigarettes   Smokeless Tobacco Never         Answers submitted by the patient for this visit:  Other (Submitted on 6/14/2024)  Please describe your symptoms.: Don't have symptoms this is a follow up from my surgery.  Have you had these symptoms before?: No  How long have you been having these symptoms?: 1-4 days  Please list any medications you are currently taking for this condition.: Same as before  Please describe any probable cause for these symptoms. : NA  Primary Reason for Visit  (Submitted on 6/14/2024)  What is the primary reason for your visit?: Other

## 2024-07-31 VITALS
SYSTOLIC BLOOD PRESSURE: 156 MMHG | OXYGEN SATURATION: 94 % | SYSTOLIC BLOOD PRESSURE: 155 MMHG | HEIGHT: 63 IN | RESPIRATION RATE: 29 BRPM | DIASTOLIC BLOOD PRESSURE: 68 MMHG | OXYGEN SATURATION: 93 % | DIASTOLIC BLOOD PRESSURE: 89 MMHG | DIASTOLIC BLOOD PRESSURE: 79 MMHG | SYSTOLIC BLOOD PRESSURE: 151 MMHG | SYSTOLIC BLOOD PRESSURE: 157 MMHG | SYSTOLIC BLOOD PRESSURE: 178 MMHG | SYSTOLIC BLOOD PRESSURE: 150 MMHG | RESPIRATION RATE: 28 BRPM | RESPIRATION RATE: 17 BRPM | WEIGHT: 197 LBS | DIASTOLIC BLOOD PRESSURE: 63 MMHG | RESPIRATION RATE: 30 BRPM | DIASTOLIC BLOOD PRESSURE: 60 MMHG | TEMPERATURE: 96.8 F | HEART RATE: 77 BPM | DIASTOLIC BLOOD PRESSURE: 80 MMHG | OXYGEN SATURATION: 96 % | SYSTOLIC BLOOD PRESSURE: 166 MMHG | DIASTOLIC BLOOD PRESSURE: 86 MMHG | RESPIRATION RATE: 22 BRPM | DIASTOLIC BLOOD PRESSURE: 141 MMHG | OXYGEN SATURATION: 97 % | DIASTOLIC BLOOD PRESSURE: 70 MMHG | HEART RATE: 87 BPM | RESPIRATION RATE: 32 BRPM | HEART RATE: 78 BPM | TEMPERATURE: 97.3 F | HEART RATE: 65 BPM | HEART RATE: 68 BPM | SYSTOLIC BLOOD PRESSURE: 163 MMHG | RESPIRATION RATE: 26 BRPM | DIASTOLIC BLOOD PRESSURE: 76 MMHG | HEART RATE: 73 BPM | OXYGEN SATURATION: 95 % | DIASTOLIC BLOOD PRESSURE: 85 MMHG | HEART RATE: 69 BPM | SYSTOLIC BLOOD PRESSURE: 190 MMHG | HEART RATE: 67 BPM | SYSTOLIC BLOOD PRESSURE: 189 MMHG | HEART RATE: 66 BPM | SYSTOLIC BLOOD PRESSURE: 160 MMHG | SYSTOLIC BLOOD PRESSURE: 139 MMHG | SYSTOLIC BLOOD PRESSURE: 164 MMHG | OXYGEN SATURATION: 100 % | SYSTOLIC BLOOD PRESSURE: 149 MMHG | RESPIRATION RATE: 15 BRPM | RESPIRATION RATE: 34 BRPM | DIASTOLIC BLOOD PRESSURE: 75 MMHG | HEART RATE: 70 BPM | DIASTOLIC BLOOD PRESSURE: 84 MMHG | DIASTOLIC BLOOD PRESSURE: 73 MMHG | RESPIRATION RATE: 23 BRPM | OXYGEN SATURATION: 75 % | RESPIRATION RATE: 18 BRPM | RESPIRATION RATE: 19 BRPM | OXYGEN SATURATION: 99 %

## 2024-08-05 ENCOUNTER — AMBULATORY SURGICAL CENTER (AMBULATORY)
Dept: URBAN - METROPOLITAN AREA SURGERY 17 | Facility: SURGERY | Age: 64
End: 2024-08-05
Payer: COMMERCIAL

## 2024-08-05 ENCOUNTER — OFFICE (AMBULATORY)
Dept: URBAN - METROPOLITAN AREA PATHOLOGY 4 | Facility: PATHOLOGY | Age: 64
End: 2024-08-05
Payer: COMMERCIAL

## 2024-08-05 DIAGNOSIS — D17.79 BENIGN LIPOMATOUS NEOPLASM OF OTHER SITES: ICD-10-CM

## 2024-08-05 DIAGNOSIS — D12.3 BENIGN NEOPLASM OF TRANSVERSE COLON: ICD-10-CM

## 2024-08-05 DIAGNOSIS — K57.30 DIVERTICULOSIS OF LARGE INTESTINE WITHOUT PERFORATION OR ABS: ICD-10-CM

## 2024-08-05 DIAGNOSIS — K64.0 FIRST DEGREE HEMORRHOIDS: ICD-10-CM

## 2024-08-05 DIAGNOSIS — K59.00 CONSTIPATION, UNSPECIFIED: ICD-10-CM

## 2024-08-05 PROBLEM — K63.5 POLYP OF COLON: Status: ACTIVE | Noted: 2024-08-05

## 2024-08-05 LAB
GI HISTOLOGY: A. TRANSVERSE COLON: (no result)
GI HISTOLOGY: PDF REPORT: (no result)

## 2024-08-05 PROCEDURE — 88305 TISSUE EXAM BY PATHOLOGIST: CPT | Performed by: PATHOLOGY

## 2024-08-05 PROCEDURE — 45385 COLONOSCOPY W/LESION REMOVAL: CPT | Performed by: INTERNAL MEDICINE

## 2024-08-05 RX ADMIN — ONDANSETRON HYDROCHLORIDE 4 MG: 4 SOLUTION ORAL at 13:21

## 2024-08-05 NOTE — SERVICEHPINOTES
Ms. Shikha Trujillo is a 64 YO F with PMH of HTN, HLD, sleep apnea, asthma presents for screening colonoscopyShe reports colonoscopy last year with a general surgery, and was incomplete and was told to have colonoscopy repeat in 6-12 months. brNo nausea, vomiting. No abdominal pain. No diarrhea or constipation. She reports 1-2 bowel movement per day. No rectal bleeding. No unintended weight loss. No dysphagia or reflux.
br
br  8.5.2024 Interval history: patient is here for screening colonscopyFamily hx: Mother and elder sister with breast cancer, Dad and brother with melanoma, sister with lung cancer. Social hx: denies tobacco, occasionally drink alcohol, no illicit drug Data reviewed:brCN 10/13 Dr Damon for screening: normalbrLabs 8/2020: hb 11.7, nl lipase and LFTsbrCT a/p 8/20: stranding R anterior mesenterybrCT angio a/p 8/20: small omental [id number] Cardiology [id number] general surgery note, lap appendectomy.br10.17.2023 hgb 13.1, MCV 89.3, PLTS 139.br10.11.2023 pathology focal acute appendicitis with foreign body giant cell reaction to entrapped luminal foreign material. No malignancy.br10.9.2023 limited exam without evidence high grade narrowing or obstruction. Diverticulosis.br9.11.2023 CT normal noncontrast CT appearance of appendix. NO evidence of appendicitis or periappendiceal fluid collection. Colonic diverticulosisbr8.6.2023 General surgery – perforated appendicitis. 
br
br

## 2024-11-26 ENCOUNTER — DOCUMENTATION (OUTPATIENT)
Dept: OBSTETRICS AND GYNECOLOGY | Facility: CLINIC | Age: 64
End: 2024-11-26

## 2024-11-26 NOTE — PROGRESS NOTES
Phone call.  Ultrasound was reviewed and discussed with the patient.  This was ordered due to postmenopausal spotting.  The patient has a slightly thickened endometrium with cystic structures.  She did have a D&C in June.  There was an endometrial polyp as well as cystic atrophy of the endometrium.  No hyperplasia or neoplasia was found at that time.  The patient has an appointment coming up next week.  At that point, we can review her history and determine the next steps in management.  I answered the patient's questions and she agrees with this recommendation

## 2024-12-04 ENCOUNTER — OFFICE VISIT (OUTPATIENT)
Dept: OBSTETRICS AND GYNECOLOGY | Facility: CLINIC | Age: 64
End: 2024-12-04
Payer: COMMERCIAL

## 2024-12-04 VITALS — WEIGHT: 200 LBS | SYSTOLIC BLOOD PRESSURE: 136 MMHG | BODY MASS INDEX: 36.58 KG/M2 | DIASTOLIC BLOOD PRESSURE: 84 MMHG

## 2024-12-04 DIAGNOSIS — N95.0 PMB (POSTMENOPAUSAL BLEEDING): Primary | ICD-10-CM

## 2024-12-04 NOTE — PROGRESS NOTES
HPI   Leilani Pérez  is a 64 y.o. female who presents to discuss postmenopausal spotting.  Her history significant for previous episode over the spring.  I took her to the operating room in June where there was a mass at the fundus of the uterus.  Pathology report showed this to be a polyp.  The polyp had cystic atrophy.  No hyperplasia or neoplasia present.  The patient does take baby aspirin daily due to coronary artery disease.  She has noted episodes of spotting over the past month.  Most recently, she has not spotted over the last week.  She had an ultrasound in our office demonstrating a slightly thickened endometrium and some cystic structures within it.    Chief Complaint   Patient presents with    Follow-up     Us review        Past Medical History:   Diagnosis Date    Asthma     Yes on an inhale and CPAP machine    COPD (chronic obstructive pulmonary disease)     Coronary artery disease     Diverticulosis     Endometriosis 4/24    GERD (gastroesophageal reflux disease)     Heart disease     History of kidney stones     History of sciatica     Hyperlipidemia     Hypertension     IC (interstitial cystitis)     Kidney stone     Cant remember how long ago it was.    Seasonal allergies     Sleep apnea     CPAP    Spine disorder     Urinary tract infection     Had in past, no current       Past Surgical History:   Procedure Laterality Date    APPENDECTOMY  10/23    Had it removed 2023    CARDIAC CATHETERIZATION      D & C HYSTEROSCOPY N/A 6/5/2024    Procedure: DILATATION AND CURETTAGE HYSTEROSCOPY WITH MYOSURE;  Surgeon: Jj Sanchez MD;  Location: [facility];  Service: Obstetrics/Gynecology;  Laterality: N/A;    TONSILLECTOMY      Age 15, I think    WISDOM TOOTH EXTRACTION      Cant remember date       Social History     Socioeconomic History    Marital status: Single   Tobacco Use    Smoking status: Former     Current packs/day: 0.50     Average packs/day: 0.5 packs/day for 3.0 years (1.5 ttl pk-yrs)      Types: Cigarettes    Smokeless tobacco: Never   Vaping Use    Vaping status: Never Used   Substance and Sexual Activity    Alcohol use: Yes     Alcohol/week: 2.0 standard drinks of alcohol     Types: 1 Cans of beer, 1 Drinks containing 0.5 oz of alcohol per week     Comment: Occasional    Drug use: No    Sexual activity: Not Currently     Partners: Male     Birth control/protection: Post-menopausal       The following portions of the patient's history were reviewed and updated as appropriate: allergies, current medications, past family history, past medical history, past social history, past surgical history and problem list.    Review of Systems  This is positive for postmenopausal spotting.  It is negative for unexplained weight change.  Negative for itching or sweats.  Negative for abdominal or pelvic pain.        Physical Exam  Vitals and nursing note reviewed.   Constitutional:       Appearance: Normal appearance.   Abdominal:      General: There is no distension.      Palpations: Abdomen is soft. There is no mass.   Genitourinary:     Comments: Normal female external genitalia  The vagina is atrophic, but without lesion.  There is no blood in the vaginal vault.  The cervix is normal in appearance.  It is not tender.  The uterus is mobile within the pelvis.  It is midline.  It is not tender to palpation.  No adnexal masses are palpable.  Neurological:      Mental Status: She is alert and oriented to person, place, and time.         Assessment    Diagnoses and all orders for this visit:    1. PMB (postmenopausal bleeding) (Primary)        Plan  I counseled the patient regarding possible causes of her postmenopausal spotting.  Certainly, her baby aspirin use could be the cause.  She did have the pathology report over the summer which showed no hyperplasia or neoplasia.  This could represent atrophy with breakthrough bleeding versus bleeding from baby aspirin versus recurrence of her polyp.  There is a remote,  but nonzero risk of hyperplasia or neoplasia.  We reviewed a diagram together and discussed this along with options for further management.  The benefits and risks of an endometrial biopsy followed by ultrasound follow-up if this biopsy were to be negative versus hysteroscopy, dilation and curettage, Myosure polypectomy versus a second opinion from GYN oncology discussed with the patient.  We discussed each approach in detail along with benefits and risks.  The patient would like to consider her options and will contact me soon with a decision.    No follow-ups on file.    Social History     Tobacco Use   Smoking Status Former    Current packs/day: 0.50    Average packs/day: 0.5 packs/day for 3.0 years (1.5 ttl pk-yrs)    Types: Cigarettes   Smokeless Tobacco Never

## 2024-12-09 DIAGNOSIS — N95.0 PMB (POSTMENOPAUSAL BLEEDING): Primary | ICD-10-CM

## (undated) DEVICE — GLV SURG BIOGEL LTX PF 7 1/2

## (undated) DEVICE — SOL IRR NACL 0.9PCT 3000ML

## (undated) DEVICE — DEV TISS REMOV MYOSURE/XL W OUTFLOW CNTRL

## (undated) DEVICE — STRAP STIRUP WO/ RNG

## (undated) DEVICE — LOU D & C HYSTEROSCOPY: Brand: MEDLINE INDUSTRIES, INC.

## (undated) DEVICE — DRAPE,UNDERBUTTOCKS,PCH,STERILE: Brand: MEDLINE

## (undated) DEVICE — SEAL HYSTERSCOPE/OUTFLOW CHANNEL MYOSURE